# Patient Record
Sex: FEMALE | Race: AMERICAN INDIAN OR ALASKA NATIVE
[De-identification: names, ages, dates, MRNs, and addresses within clinical notes are randomized per-mention and may not be internally consistent; named-entity substitution may affect disease eponyms.]

---

## 2019-12-09 ENCOUNTER — HOSPITAL ENCOUNTER (EMERGENCY)
Dept: HOSPITAL 5 - ED | Age: 44
Discharge: HOME | End: 2019-12-09
Payer: COMMERCIAL

## 2019-12-09 VITALS — SYSTOLIC BLOOD PRESSURE: 123 MMHG | DIASTOLIC BLOOD PRESSURE: 71 MMHG

## 2019-12-09 DIAGNOSIS — I10: ICD-10-CM

## 2019-12-09 DIAGNOSIS — Z79.899: ICD-10-CM

## 2019-12-09 DIAGNOSIS — M54.5: Primary | ICD-10-CM

## 2019-12-09 DIAGNOSIS — E11.9: ICD-10-CM

## 2019-12-09 DIAGNOSIS — Z98.890: ICD-10-CM

## 2019-12-09 LAB
BACTERIA #/AREA URNS HPF: (no result) /HPF
BASOPHILS # (AUTO): 0.1 K/MM3 (ref 0–0.1)
BASOPHILS NFR BLD AUTO: 1.5 % (ref 0–1.8)
BILIRUB UR QL STRIP: (no result)
BLOOD UR QL VISUAL: (no result)
BUN SERPL-MCNC: 18 MG/DL (ref 7–17)
BUN/CREAT SERPL: 20 %
CALCIUM SERPL-MCNC: 8.7 MG/DL (ref 8.4–10.2)
EOSINOPHIL # BLD AUTO: 0.3 K/MM3 (ref 0–0.4)
EOSINOPHIL NFR BLD AUTO: 4.9 % (ref 0–4.3)
HCT VFR BLD CALC: 36.6 % (ref 30.3–42.9)
HEMOLYSIS INDEX: 3
HGB BLD-MCNC: 11.8 GM/DL (ref 10.1–14.3)
LYMPHOCYTES # BLD AUTO: 2.5 K/MM3 (ref 1.2–5.4)
LYMPHOCYTES NFR BLD AUTO: 42 % (ref 13.4–35)
MCHC RBC AUTO-ENTMCNC: 32 % (ref 30–34)
MCV RBC AUTO: 79 FL (ref 79–97)
MONOCYTES # (AUTO): 0.5 K/MM3 (ref 0–0.8)
MONOCYTES % (AUTO): 8.4 % (ref 0–7.3)
MUCOUS THREADS #/AREA URNS HPF: (no result) /HPF
PH UR STRIP: 5 [PH] (ref 5–7)
PLATELET # BLD: 346 K/MM3 (ref 140–440)
PROT UR STRIP-MCNC: (no result) MG/DL
RBC # BLD AUTO: 4.61 M/MM3 (ref 3.65–5.03)
RBC #/AREA URNS HPF: 4 /HPF (ref 0–6)
UROBILINOGEN UR-MCNC: < 2 MG/DL (ref ?–2)
WBC #/AREA URNS HPF: 3 /HPF (ref 0–6)

## 2019-12-09 PROCEDURE — 36415 COLL VENOUS BLD VENIPUNCTURE: CPT

## 2019-12-09 PROCEDURE — 80048 BASIC METABOLIC PNL TOTAL CA: CPT

## 2019-12-09 PROCEDURE — 81001 URINALYSIS AUTO W/SCOPE: CPT

## 2019-12-09 PROCEDURE — 81025 URINE PREGNANCY TEST: CPT

## 2019-12-09 PROCEDURE — 85025 COMPLETE CBC W/AUTO DIFF WBC: CPT

## 2019-12-09 NOTE — EMERGENCY DEPARTMENT REPORT
Blank Doc





- Documentation


Documentation: 





44-year-old female that presents with left flank pain. Stated was told that she 

has hematuria in Urgent care.  





This initial assessment/diagnostic orders/clinical plan/treatment(s) is/are 

subject to change based on patient's health status, clinical progression and 

re-assessment by fellow clinical providers in the ED.  Further treatment and 

workup at subsequent clinical providers discretion.  Patient/guardians urged not

to elope from the ED as their condition may be serious if not clinically 

assessed and managed.  Initial orders include:


1- Patient sent to [facility] for further evaluation and treatment


2- UA

## 2019-12-09 NOTE — EMERGENCY DEPARTMENT REPORT
ED General Adult HPI





- General


Chief complaint: Urogenital-Female


Stated complaint: LOWER BACK PAIN


Time Seen by Provider: 12/09/19 19:07


Source: patient


Mode of arrival: Ambulatory


Limitations: No Limitations





- History of Present Illness


Initial comments: 





Patient is a 44-year-old female presents emergency room with complaints of lower

back pain that began a week ago.  She states that last week she went to urgent 

care and had microscopic hematuria.  She states they did a urine culture which 

was negative for bacteria.  pt states that her last menstrual cycle ended on 

November 30.  She states that she went to urgent care on December 4.  She denies

any fall, injury, numbness, weakness, any other symptoms.  She states that she 

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  She states she has a past history of 

diabetes and hypertension.  She denies any allergies medications.





- Related Data


                                  Previous Rx's











 Medication  Instructions  Recorded  Last Taken  Type


 


Cyclobenzaprine [Flexeril] 10 mg PO QHS PRN #12 tablet 12/09/19 Unknown Rx


 


Naproxen [EC-Naproxen] 500 mg PO BID PRN #14 tablet. 12/09/19 Unknown Rx











                                    Allergies











Allergy/AdvReac Type Severity Reaction Status Date / Time


 


No Known Allergies Allergy   Unverified 12/09/19 18:34














ED Review of Systems


ROS: 


Stated complaint: LOWER BACK PAIN


Other details as noted in HPI





Comment: All other systems reviewed and negative





ED Past Medical Hx





- Past Medical History


Hx Hypertension: Yes


Hx Diabetes: Yes





- Surgical History


Past Surgical History?: Yes


Additional Surgical History: Ectopic pregnancy.  C section.  Gastric sleeve





- Social History


Smoking Status: Never Smoker


Substance Use Type: None





- Medications


Home Medications: 


                                Home Medications











 Medication  Instructions  Recorded  Confirmed  Last Taken  Type


 


Cyclobenzaprine [Flexeril] 10 mg PO QHS PRN #12 tablet 12/09/19  Unknown Rx


 


Naproxen [EC-Naproxen] 500 mg PO BID PRN #14 tablet. 12/09/19  Unknown Rx














ED Physical Exam





- General


Limitations: No Limitations


General appearance: alert, in no apparent distress





- Head


Head exam: Present: atraumatic, normocephalic





- Eye


Eye exam: Present: normal appearance





- ENT


ENT exam: Present: mucous membranes moist





- Respiratory


Respiratory exam: Present: normal lung sounds bilaterally.  Absent: respiratory 

distress, wheezes, rales, rhonchi, stridor, chest wall tenderness, accessory 

muscle use, decreased breath sounds, prolonged expiratory





- Cardiovascular


Cardiovascular Exam: Present: regular rate, normal rhythm, normal heart sounds. 

Absent: systolic murmur, diastolic murmur, rubs, gallop





- Back Exam


Back exam: Present: normal inspection, full ROM, paraspinal tenderness (mild 

lumbar paraspinal muscular TTP bilaterally, no midline T-spine or L-spine 

tenderness, no step offs, no deformities).  Absent: CVA tenderness (R), CVA 

tenderness (L), vertebral tenderness





- Neurological Exam


Neurological exam: Present: alert, oriented X3





- Psychiatric


Psychiatric exam: Present: normal affect, normal mood





- Skin


Skin exam: Present: warm, dry, intact





ED Course


                                   Vital Signs











  12/09/19 12/09/19





  19:08 23:40


 


Temperature 97.6 F 


 


Pulse Rate 91 H 86


 


Respiratory 18 17





Rate  


 


Blood Pressure 123/71 


 


O2 Sat by Pulse 100 99





Oximetry  














ED Medical Decision Making





- Lab Data


Result diagrams: 


                                 12/09/19 22:48





                                 12/09/19 22:48








                                   Lab Results











  12/09/19 12/09/19 12/09/19 Range/Units





  20:45 22:48 22:48 


 


WBC   6.0   (4.5-11.0)  K/mm3


 


RBC   4.61   (3.65-5.03)  M/mm3


 


Hgb   11.8   (10.1-14.3)  gm/dl


 


Hct   36.6   (30.3-42.9)  %


 


MCV   79   (79-97)  fl


 


MCH   26 L   (28-32)  pg


 


MCHC   32   (30-34)  %


 


RDW   14.2   (13.2-15.2)  %


 


Plt Count   346   (140-440)  K/mm3


 


Lymph % (Auto)   42.0 H   (13.4-35.0)  %


 


Mono % (Auto)   8.4 H   (0.0-7.3)  %


 


Eos % (Auto)   4.9 H   (0.0-4.3)  %


 


Baso % (Auto)   1.5   (0.0-1.8)  %


 


Lymph #   2.5   (1.2-5.4)  K/mm3


 


Mono #   0.5   (0.0-0.8)  K/mm3


 


Eos #   0.3   (0.0-0.4)  K/mm3


 


Baso #   0.1   (0.0-0.1)  K/mm3


 


Seg Neutrophils %   43.2   (40.0-70.0)  %


 


Seg Neutrophils #   2.6   (1.8-7.7)  K/mm3


 


Sodium    142  (137-145)  mmol/L


 


Potassium    4.3  (3.6-5.0)  mmol/L


 


Chloride    105.9  ()  mmol/L


 


Carbon Dioxide    26  (22-30)  mmol/L


 


Anion Gap    14  mmol/L


 


BUN    18 H  (7-17)  mg/dL


 


Creatinine    0.9  (0.7-1.2)  mg/dL


 


Estimated GFR    > 60  ml/min


 


BUN/Creatinine Ratio    20  %


 


Glucose    111 H  ()  mg/dL


 


Calcium    8.7  (8.4-10.2)  mg/dL


 


Urine Color  Yellow    (Yellow)  


 


Urine Turbidity  Slightly-cloudy    (Clear)  


 


Urine pH  5.0    (5.0-7.0)  


 


Ur Specific Gravity  1.027    (1.003-1.030)  


 


Urine Protein  <15 mg/dl    (Negative)  mg/dL


 


Urine Glucose (UA)  Neg    (Negative)  mg/dL


 


Urine Ketones  Neg    (Negative)  mg/dL


 


Urine Blood  Sm    (Negative)  


 


Urine Nitrite  Neg    (Negative)  


 


Urine Bilirubin  Neg    (Negative)  


 


Urine Urobilinogen  < 2.0    (<2.0)  mg/dL


 


Ur Leukocyte Esterase  Neg    (Negative)  


 


Urine WBC (Auto)  3.0    (0.0-6.0)  /HPF


 


Urine RBC (Auto)  4.0    (0.0-6.0)  /HPF


 


U Epithel Cells (Auto)  10.0    (0-13.0)  /HPF


 


Urine Bacteria (Auto)  1+    (Negative)  /HPF


 


Urine Mucus  Few    /HPF


 


Urine HCG, Qual  Negative    (Negative)  














- Medical Decision Making





Patient is a 44-year-old female presents emergency room with complaints of lower

back pain that began a week ago.  She states that last week she went to urgent 

care and had microscopic hematuria.  She states they did a urine culture which 

was negative for bacteria.  pt states that her last menstrual cycle ended on 

November 30.  She states that she went to urgent care on December 4.  She denies

any fall, injury, numbness, weakness, any other symptoms.  She states that she 

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  She states she has a past history of bala

betes and hypertension.  She denies any allergies medications.  Rales are 

normal.  On exam no CVA tenderness, mild lumbar paraspinal muscular TTP 

bilaterally, no midline T-spine or L-spine tenderness, no step offs, no 

deformities.  Labs are stable.  Kidney function is normal.  UA without UTI or 

blood.  pt given prescription for Flexeril and naproxen for low back strain. 

advised pt please take medication as prescribed as needed.  do not drive or 

operate heavy machinery while taking muscle relaxer. Please follow-up with a 

primary care doctor in the next 2-3 days.  May use ice pack, heating pad, rest, 

epsom salt bath.  Return to the emergency room for any new or worsening 

symptoms.





- Differential Diagnosis


UTI, nephrolithiasis, pyelonephritis, DENITA, muscle strain, sciatica, DDD


Critical care attestation.: 


If time is entered above; I have spent that time in minutes in the direct care 

of this critically ill patient, excluding procedure time.








ED Disposition


Clinical Impression: 


Low back pain


Qualifiers:


 Chronicity: acute Back pain laterality: bilateral Sciatica presence: without 

sciatica Qualified Code(s): M54.5 - Low back pain





Disposition: DC-01 TO HOME OR SELFCARE


Is pt being admited?: No


Does the pt Need Aspirin: No


Condition: Stable


Instructions:  Muscle Strain (ED)


Additional Instructions: 


please take medication as prescribed as needed.  do not drive or operate heavy 

machinery while taking muscle relaxer. Please follow-up with a primary care 

doctor in the next 2-3 days.  May use ice pack, heating pad, rest, epsom salt 

bath.  Return to the emergency room for any new or worsening symptoms.


Prescriptions: 


Cyclobenzaprine [Flexeril] 10 mg PO QHS PRN #12 tablet


 PRN Reason: Muscle Spasm


Naproxen [EC-Naproxen] 500 mg PO BID PRN #14 tablet.dr


 PRN Reason: pain


Referrals: 


INO HARRISON MD [Staff Physician] - 2-3 Days


[facility] [Provider Group] - 2-3 Days


Time of Disposition: 23:34


Print Language: ENGLISH